# Patient Record
(demographics unavailable — no encounter records)

---

## 2025-07-25 NOTE — HISTORY OF PRESENT ILLNESS
[Breast milk] : breast milk [Hours between feeds ___] : Child is fed every [unfilled] hours [Normal] : Normal [Frequency of stools: ___] : Frequency of stools: [unfilled]  stools [per day] : per day. [Green/brown] : green/brown [Tarry] : tarry [In Bassinet/Crib] : sleeps in bassinet/crib [Water heater temperature set at <120 degrees F] : Water heater temperature set at <120 degrees F [Rear facing car seat in back seat] : Rear facing car seat in back seat [Carbon Monoxide Detectors] : Carbon monoxide detectors at home [Smoke Detectors] : Smoke detectors at home. [Hepatitis B Vaccine Given] : Hepatitis B vaccine given [FreeTextEntry1] : Reviewed prenatal problems requiring potential follow up for feedings, jaundice, kidney/brain/heart/other sonograms, and risk factors for hip dysplasia.     Hospital course and records available reviewed.   GA: 39+2  or :  Maternal history: noncontributory Birth hx: LGA, persistent right umbilical vein s/p normal fetal echo Hospital course: wnl   Growth: Birth weight: 9lbs 3.5oz Discharge weight: 3.97kg (8lbs 12oz)   Bloodwork: Mom blood type: O+ Baby blood type: O+ Rhys? neg G6PD: SBU NBS#: pending   Screening: Passed CCHD? yes Passed hearing? yes Received Hep B vaccine? no Received RSV vaccine? no

## 2025-07-25 NOTE — PHYSICAL EXAM
[Alert] : alert [Normocephalic] : normocephalic [Flat Open Anterior [place]] : flat open anterior fontanelle [PERRL] : PERRL [Red Reflex Bilateral] : red reflex bilateral [Normally Placed Ears] : normally placed ears [Auricles Well Formed] : auricles well formed [Clear Tympanic membranes] : clear tympanic membranes [Light reflex present] : light reflex present [Bony structures visible] : bony structures visible [Patent Auditory Canal] : patent auditory canal [Nares Patent] : nares patent [Palate Intact] : palate intact [Uvula Midline] : uvula midline [Supple, full passive range of motion] : supple, full passive range of motion [Symmetric Chest Rise] : symmetric chest rise [Clear to Auscultation Bilaterally] : clear to auscultation bilaterally [Regular Rate and Rhythm] : regular rate and rhythm [S1, S2 present] : S1, S2 present [+2 Femoral Pulses] : +2 femoral pulses [Soft] : soft [Bowel Sounds] : bowel sounds present [Umbilical Stump Dry, Clean, Intact] : umbilical stump dry, clean, intact [Normal external genitailia] : normal external genitalia [Circumcised] : circumcised [Central Urethral Opening] : central urethral opening [Testicles Descended Bilaterally] : testicles descended bilaterally [Patent] : patent [Normally Placed] : normally placed [No Abnormal Lymph Nodes Palpated] : no abnormal lymph nodes palpated [Symmetric Flexed Extremities] : symmetric flexed extremities [Startle Reflex] : startle reflex present [Suck Reflex] : suck reflex present [Rooting] : rooting reflex present [Palmar Grasp] : palmar grasp present [Plantar Grasp] : plantar reflex present [Symmetric [place]] : symmetric [place] [Acute Distress] : no acute distress [Icteric sclera] : nonicteric sclera [Discharge] : no discharge [Palpable Masses] : no palpable masses [Murmurs] : no murmurs [Tender] : nontender [Distended] : not distended [Hepatomegaly] : no hepatomegaly [Splenomegaly] : no splenomegaly [Quiles-Ortolani] : negative Quiles-Ortolani [Spinal Dimple] : no spinal dimple [Tuft of Hair] : no tuft of hair [Jaundice] : not jaundice

## 2025-07-25 NOTE — DISCUSSION/SUMMARY
[FreeTextEntry1] : Baby gaining weight well, approx >30g/day, now at birth weight. Continue feedings on demand, with a back up plan for scheduled feedings every 2-3 hours. Once weight gain is well established, it is generally then time to forgo the back up plan scheduled feedings. Clustered feedings, amount per feedings, and spacing of feedings will change frequently; follow the infant's lead. Anticipate 8-12 feedings per day. Breast feeding benefits reviewed, as well as basic best practices. Prenatal risk factors for hip dysplasia reviewed. Hospital records reviewed if available. Advance as directed Vitamin D relevance and importance reviewed Follow up with any directions from the hospital or medical team including any prenatal discussions on matters that may require follow up. Some examples may include sonogram findings related to the kidneys, brain, or heart. [email or web address] as a resource over generic searches Cord and skin care Temperature definitions in infants, measuring temperature, presence or absence of concerning symptoms for temperature context, and appropriate timing of access to care reviewed.   TCB 10.4 today (PT 21.5), mildly elevated from TCB 7.3 2 days ago. Return to clinic in 1 month for 1m WCC.